# Patient Record
(demographics unavailable — no encounter records)

---

## 2024-10-07 NOTE — ASSESSMENT
[FreeTextEntry1] : Patient with a history of hypertension recently changed her medications and her blood pressure is gone up quite a bit had a epistaxis on Saturday seen at [facility] given TXA stopped then had another nosebleed over on Sunday but it responded to oxymetazoline comes in today no bleeding since on examination endoscopically no evidence of any abnormalities of lesions tumors masses patient was reassured do not have an etiology for her nosebleeds but no further acute interventions indicated at this time she will discuss with her primary care doctor maybe put her back on the hypertensive medication she was on before.

## 2024-10-07 NOTE — CONSULT LETTER
[Dear  ___] : Dear  [unfilled], [Consult Letter:] : I had the pleasure of evaluating your patient, [unfilled]. [Please see my note below.] : Please see my note below. [Consult Closing:] : Thank you very much for allowing me to participate in the care of this patient.  If you have any questions, please do not hesitate to contact me. [Sincerely,] : Sincerely, [FreeTextEntry3] : Milo Dorsey MD [facility] Associated Professor, [place]

## 2024-10-07 NOTE — END OF VISIT
[FreeTextEntry3] : I, Dr. Dorsey personally performed the evaluation and management (E/M) services including all necessary procedures, for this new patient. That E/M includes conducting the clinically appropriate initial history &/or exam, assessing all conditions, and establishing the plan of care. Today, my DORETHA, Becca Luna, was here to observe &/or participate in the visit & follow plan of care established by me.

## 2024-10-07 NOTE — HISTORY OF PRESENT ILLNESS
[de-identified] : Patient has been having nosebleeds recently. She reports that before this started last week her last nosebleed was 25 years ago and it was severe. These nosebleeds have been moderate, so she went to the ER at [facility] where they were able to stop it and she was given afrin to use topically  in the event another bleed starts. She denies nasal congestion or runny nose

## 2025-01-16 NOTE — PROCEDURE
[FreeTextEntry1] : Nerve conduction studies and electromyography [FreeTextEntry2] : Acroparesthesias [FreeTextEntry3] : Electrodiagnostic testing was performed in the upper and lower extremities.  The right radial and ulnar and both median sensory potentials and conduction velocities were normal.  The median motor distal latencies, compound muscle action potentials and conduction velocities were normal.  The right ulnar motor distal latency and compound muscle action potential were normal.  There was slowing of ulnar conduction across the right elbow segment.  The right ulnar and both median F waves were normal.  The sural sensory potentials and conduction velocities were normal.  The right peroneal and tibial motor distal latencies, compound muscle action potentials and conduction velocities were normal.  The right tibial F wave was normal and the right peroneal F wave was nonreactive.  The tibial H reflexes were nonreactive.  Needle electromyography was performed in several right lower extremity and lumbar paraspinal muscles.  There was mild spontaneous activity in the lumbar paraspinal muscles only.  All other muscles were silent at rest.  Motor units and recruitment patterns were normal.  Conclusions: This was an abnormal study.  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  There was no electrophysiologic evidence of right lumbar radiculopathy, right or left median neuropathy or sensorimotor polyneuropathy.  Clinical correlation: MRI of the lumbar spine revealed mild to moderate multilevel lumbar spondylosis with mild central canal stenosis and mild to moderate foraminal narrowing.  MRI of the cervical spine revealed degenerative changes without abnormality of cord signal.  There were varying degrees of neuroforaminal narrowing.  Today's study did not reveal evidence of a large fiber sensory polyneuropathy or significant median neuropathies at the wrist.  She did have a right ulnar entrapment in the cubital tunnel.  She has prominent symptoms in all 4 limbs.  I question whether the symptoms might represent Raynaud's phenomenon.  A recent CCP-IgG was markedly positive.  She will undergo updated serologies and inflammatory markers and will need follow-up with her rheumatologist.  I will also order ultrasound studies of her median and ulnar nerves at the wrist and elbows.  Further management will depend upon these results and her clinical course.

## 2025-01-16 NOTE — CONSULT LETTER
[Dear  ___] : Dear  [unfilled], [Consult Letter:] : I had the pleasure of evaluating your patient, [unfilled]. [Please see my note below.] : Please see my note below. [Consult Closing:] : Thank you very much for allowing me to participate in the care of this patient.  If you have any questions, please do not hesitate to contact me. [Sincerely,] : Sincerely, [DrKyle  ___] : Dr. JUAREZ [FreeTextEntry3] : Gaston Decker M.D.

## 2025-03-04 NOTE — DATA REVIEWED
[FreeTextEntry1] : Labs and chart notes reviewed today with patient +BR[place] +CCP Sm, RNP negative

## 2025-03-04 NOTE — HISTORY OF PRESENT ILLNESS
[FreeTextEntry1] : Transfer of care from Dr Corey Mitchell --------------------------------------------------   at today's visit -stopped HCQ October 2024 since she had a nose bleed and was concerned it was related to HCQ -does not endorse a change being off of it -joint pain: occasional pain at the hips, knees denies any joint swelling redness or warmth, no morning stiffness -occasional dry eyes, no dry mouth -no oral or nasal ulcers - no prior history of serositis  Rheumatologic ROS: - No alopecia - No history of red/painful eye - No Raynaud's  - No rashes or photosensitivity - No miscarriages or pregnancy complications - No history of blood clots - No family history of auto-immune disease

## 2025-03-04 NOTE — ASSESSMENT
[FreeTextEntry1] : Transfer of care from Dr Corey Mitchell --------------------------------------------------   =BROOK[place]N 1:320, DsDNA 18, normal C3/C4 -no significant cytopenias -check urine studies  -ROS: arthrlagias (non inflammatory in pattern), no Raynaud's, no oral/nasal ulcers, no rashes, no history of serositis... overall not consistent with SLE at this time -self d.c HCQ as she was concerned with epistaxis, did not notice a change in her joint pain would like to remain off of it   =RF negative, +CCP ; ESR 35 normal CRP arthralgias non inflammatory in pattern  X-rays of hands without arthritic changes or erosive disease overall not suggestive of RA at this time will obtain X-rays of feet to asses for erosive disease, and monitor clinically for now   =[facility] DEXA July 2024, osteopenia received oral bisphosphonates for years, stopped 5 months ago  repeat DEXA due in 2026  =Neuropathy -follows with neurologist -EMG without signs of sensory neuropathy, normal CK ~overall improved clinically   =Elevated Kappa/ Lambda ratio advised to see hematology to remain UTD with age appropriate screening   RTO in 3 months

## 2025-03-04 NOTE — PHYSICAL EXAM
[General Appearance - Alert] : alert [General Appearance - In No Acute Distress] : in no acute distress [Auscultation Breath Sounds / Voice Sounds] : lungs were clear to auscultation bilaterally [Musculoskeletal - Swelling] : no joint swelling seen [] : no rash [Impaired Insight] : insight and judgment were intact

## 2025-06-10 NOTE — HISTORY OF PRESENT ILLNESS
[de-identified] : at today's visit [FreeTextEntry1] : doing well overall no new joint pain or swelling no dry eyes no rashes no SOB  no weakness

## 2025-06-10 NOTE — ASSESSMENT
[FreeTextEntry1] : Transfer of care from Dr Corey Mitchell --------------------------------------------------   =BROOK[place]N 1:320, DsDNA 18, normal C3/C4 -no significant cytopenias -check urine studies -ROS: arthralgias (non inflammatory in pattern), no Raynaud's, no oral/nasal ulcers, no rashes, no history of serositis... overall not consistent with SLE at this time -self d.c HCQ as she was concerned with epistaxis, did not notice a change in her joint pain would like to remain off of it [] obtain disease activity measures today   =RF negative, +CCP ; ESR 35 normal CRP arthralgias non inflammatory in pattern X-rays of hands without arthritic changes or erosive disease overall not suggestive of RA at this time [] will obtain X-rays of feet to asses for erosive disease, and monitor clinically for now  =[facility] DEXA July 2024, osteopenia received oral bisphosphonates for years, stopped 5 months ago repeat DEXA due in 2026  =Neuropathy -follows with neurologist -EMG without signs of sensory neuropathy, normal CK ~overall improved clinically  =Elevated Kappa/ Lambda ratio advised to see hematology to remain UTD with age appropriate screening  RTO in 4-6 months

## 2025-06-10 NOTE — DATA REVIEWED
[FreeTextEntry1] : Labs and chart notes reviewed today with patient +[place] +CCP Sm, RNP negative.

## 2025-06-26 NOTE — PHYSICAL EXAM
[FreeTextEntry1] : Constitutional: No signs of distress or signs of toxicity. Mental Status: Alert and well oriented. Speech fluent. No aphasia. Fund of knowledge intact. Psychiatric: Mood stable Cranial Nerve: PERRLA: No papilledema; No VFC; EOM full. no nystagmus. No Erinn. V1-3 intact. No facial asymmetry, hearing grossly intact; palate elevates symmetrically, tongue midline Motor: No involuntary movements noted.  Adequate bulk, tone throughout, 5/5 strength of all muscle groups DTR: present and symmetrical; no clonus, plantars  downgoing Sensory: intact to primary and secondary modalities; neg Romberg Cerebellar: adequate finger to nose and heel to shin bilaterally. Gait: non antalgic or ataxic. Eyes: no redness or swelling HEENT: intact; no signs of trauma. Neck: No masses noted Pulmonary: no respiratory distress Vascular: no temperature, color change or sudomotor changes.; no edema Musculoskeletal: examination of the cervical spine reveals no midline tenderness, range of motion full upon flexion, extension and lateral rotation. Negative facet tenderness, Negative Spurlings bilaterally. examination of the lumbar spine reveals no midline or paraspinal tenderness; Range of motion full upon flexion, extension and lateral rotation; negative facet loading, No tenderness of sciatic notch, No tenderness of bilateral greater trochanters, Negative DEANNA, negative SLRT bilaterally, Skin: No rash.

## 2025-06-26 NOTE — ASSESSMENT
[FreeTextEntry1] : This is a case of a 73-year-old woman with what appears to be a migrainous headache disorder.  Her neurologic examination is nonfocal.  There are no symptoms suggestive of a secondary headache disorder such as GCA.  She denies new proximal pain, allodynia of scalp, jaw claudication.  After detailed discussion with the patient on various treatment options, we will recommend the followin.  Slowly taper over the next 4 to 6 weeks Excedrin.  Will initiate Ubrelvy 100 mg patient provided samples.  She was advised of the potential risks benefits and the proper use of this medication.  She is to follow-up in 6 to 8 weeks.  Other options are included were a 6-day course of Medrol Dosepak to potentially eradicate the headache and stop the use of Excedrin.  She will think about this at our next visit.  I spent 45 minutes  reviewing history, medical records, clinical evaluation, management, discussion of plan.

## 2025-06-26 NOTE — HISTORY OF PRESENT ILLNESS
[FreeTextEntry1] : This is a case of a 73-year-old right-handed female with history of hypertension and "headaches all of my life".  She notes that the headaches were more episodic in nature until approximately 6 months ago when, without inciting event, became a daily occurrence.  She often will wake up in middle the night with a headache.  Headaches are located in the right temple region described as a nonthrobbing achy sensation not associate with nausea vomiting, photophobia, phonophobia, or aura.  She notes that her headaches interfere with her quality of life.  She has had a CAT scan of the head years ago by her neurologist which was reportedly negative.  She takes Excedrin almost on a daily basis.  She also takes magnesium dose is not clear on a daily basis.  Family history maternal aunt has migraines.  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  Denies tobacco use or cannabis use.  Rarely drinks alcohol which can contribute to headache.  Allergies: Aspirin causes nosebleed.  Palpitations.  Penicillin.